# Patient Record
Sex: MALE | Race: WHITE | NOT HISPANIC OR LATINO | Employment: OTHER | URBAN - METROPOLITAN AREA
[De-identification: names, ages, dates, MRNs, and addresses within clinical notes are randomized per-mention and may not be internally consistent; named-entity substitution may affect disease eponyms.]

---

## 2024-02-06 ENCOUNTER — TELEPHONE (OUTPATIENT)
Age: 64
End: 2024-02-06

## 2024-02-06 ENCOUNTER — HOSPITAL ENCOUNTER (OUTPATIENT)
Dept: RADIOLOGY | Facility: HOSPITAL | Age: 64
Discharge: HOME | End: 2024-02-06
Attending: ORTHOPAEDIC SURGERY
Payer: MEDICARE

## 2024-02-06 DIAGNOSIS — S82.61XD CLOSED DISP FRACTURE OF RIGHT LATERAL MALLEOLUS WITH ROUTINE HEALING: Primary | ICD-10-CM

## 2024-02-06 DIAGNOSIS — M25.571 PAIN, JOINT, ANKLE AND FOOT, RIGHT: ICD-10-CM

## 2024-02-06 PROCEDURE — 73610 X-RAY EXAM OF ANKLE: CPT

## 2024-02-06 PROCEDURE — 99204 OFFICE O/P NEW MOD 45 MIN: CPT | Performed by: ORTHOPAEDIC SURGERY

## 2024-02-06 NOTE — PROGRESS NOTES
Assessment:   Diagnosis ICD-10-CM Associated Orders   1. Pain, joint, ankle and foot, right  M25.571 XR ankle 3+ vw right     Ambulatory Referral to Physical Therapy      2. Trimalleolar fracture of ankle, closed, right, initial encounter  S82.851A Ambulatory Referral to Physical Therapy          Plan:  Weightbearing as tolerated  Physical therapy for proprioception exercises  Elevation for swelling as needed  Follow-up in 3 weeks and at this time we will obtain x-rays of the right ankle 3 views  Orders were discussed with patient and patient's wife and all were in agreement with treatment plan    To do next visit:  Return in about 3 months (around 5/6/2024) for right ankle.    The above stated was discussed in layman's terms and the patient expressed understanding.  All questions were answered to the patient's satisfaction.       Scribe Attestation      I,:  Lesa Orlando am acting as a scribe while in the presence of the attending physician.:       I,:  Alissa Turk MD personally performed the services described in this documentation    as scribed in my presence.:               Subjective:   González Manrique is a 64 y.o. male who presents today for an initial evaluation for his right trimalleolar fracture, 11/20/2023 Hew was treated at [facility].   He did have a 2nd opinion who felt he needed surgical intervention. His Oncologist, who felt that he was not a good surgical candidate due to going through surgery.   He was an avid pickleballer and wants to get back on the court.   He has treated in a low tide cam boot with a walker or cane. He reports that the pain has gradually gotten better. He has tried a hike ankle boot for support.       Review of systems negative unless otherwise specified in HPI  Review of Systems   Constitutional:  Negative for chills, fever and unexpected weight change.   HENT:  Negative for hearing loss, nosebleeds and sore throat.    Eyes:  Negative for pain, redness and visual  disturbance.   Respiratory:  Negative for cough, shortness of breath and wheezing.    Cardiovascular:  Negative for chest pain, palpitations and leg swelling.   Gastrointestinal:  Negative for abdominal pain, nausea and vomiting.   Endocrine: Negative for polydipsia and polyuria.   Genitourinary:  Negative for dysuria and hematuria.   Musculoskeletal:  Positive for gait problem.   Skin:  Negative for rash and wound.   Neurological:  Negative for dizziness, light-headedness and headaches.   Psychiatric/Behavioral:  Negative for decreased concentration, dysphoric mood and suicidal ideas. The patient is not nervous/anxious.        History reviewed. No pertinent past medical history.    History reviewed. No pertinent surgical history.    History reviewed. No pertinent family history.    Social History     Occupational History    Not on file   Tobacco Use    Smoking status: Not on file    Smokeless tobacco: Not on file   Substance and Sexual Activity    Alcohol use: Not on file    Drug use: Not on file    Sexual activity: Not on file       No current outpatient medications on file.    No Known Allergies       There were no vitals filed for this visit.    There is no height or weight on file to calculate BMI.  Wt Readings from Last 3 Encounters:   No data found for Wt       Objective:                    Ortho Exam  Right ankle:  No abrasions or open wounds  Generalized swelling but no pitting edema  No tenderness to palpation over the lateral malleolus region  Patient able to actively plantarflex and dorsiflex ankle as well as subtalar motion with inversion and eversion without pain  Strength of all of these muscle groups of about 4 out of 5  No pitting edema  Distally neurologically and vascularly intact  Diagnostics, reviewed and taken today if performed as documented:    None performed      The attending physician has personally reviewed the pertinent films in PACS and interpretation is as follows:  Right  "ankle:  Abundant callus formation noted over the lateral malleolus fracture site  Ankle mortise maintained    Procedures, if performed today:    Procedures    None performed      Portions of the record may have been created with voice recognition software.  Occasional wrong word or \"sound a like\" substitutions may have occurred due to the inherent limitations of voice recognition software.  Read the chart carefully and recognize, using context, where substitutions have occurred.  "

## 2024-02-06 NOTE — PATIENT INSTRUCTIONS
Up with the good and down with the bad when ambulating steps.

## 2024-02-06 NOTE — TELEPHONE ENCOUNTER
Doctor: Rosalee  Caller Name: Lety  SIVAN#:[phone]     Radiology called to speak to clinical team regarding right ankle Xray ordered by provider.

## 2024-04-16 ENCOUNTER — HOSPITAL ENCOUNTER (OUTPATIENT)
Dept: RADIOLOGY | Facility: HOSPITAL | Age: 64
Discharge: HOME | End: 2024-04-16
Attending: ORTHOPAEDIC SURGERY
Payer: MEDICARE

## 2024-04-16 VITALS
HEART RATE: 102 BPM | WEIGHT: 250 LBS | BODY MASS INDEX: 40.18 KG/M2 | DIASTOLIC BLOOD PRESSURE: 96 MMHG | HEIGHT: 66 IN | SYSTOLIC BLOOD PRESSURE: 138 MMHG

## 2024-04-16 DIAGNOSIS — S82.61XD CLOSED DISP FRACTURE OF RIGHT LATERAL MALLEOLUS WITH ROUTINE HEALING: Primary | ICD-10-CM

## 2024-04-16 DIAGNOSIS — S82.61XD CLOSED DISP FRACTURE OF RIGHT LATERAL MALLEOLUS WITH ROUTINE HEALING: ICD-10-CM

## 2024-04-16 PROCEDURE — 73610 X-RAY EXAM OF ANKLE: CPT

## 2024-04-16 PROCEDURE — 99213 OFFICE O/P EST LOW 20 MIN: CPT | Performed by: ORTHOPAEDIC SURGERY

## 2024-04-16 RX ORDER — TEMAZEPAM 15 MG/1
30 CAPSULE ORAL DAILY PRN
COMMUNITY
Start: 2024-03-29 | End: 2024-04-28

## 2024-04-16 RX ORDER — AZELASTINE HYDROCHLORIDE 137 UG/1
SPRAY, METERED NASAL
COMMUNITY

## 2024-04-16 RX ORDER — POTASSIUM CHLORIDE 20 MEQ/1
20 TABLET, EXTENDED RELEASE ORAL DAILY
COMMUNITY
Start: 2023-11-06

## 2024-04-16 RX ORDER — OXYCODONE HYDROCHLORIDE 5 MG/1
TABLET ORAL
COMMUNITY
Start: 2023-10-30

## 2024-04-16 RX ORDER — SPIRONOLACTONE 25 MG/1
25 TABLET ORAL DAILY
COMMUNITY

## 2024-04-16 RX ORDER — FUROSEMIDE 20 MG/1
20 TABLET ORAL DAILY PRN
COMMUNITY
Start: 2024-03-11

## 2024-04-16 RX ORDER — DULOXETIN HYDROCHLORIDE 30 MG/1
30 CAPSULE, DELAYED RELEASE ORAL DAILY
COMMUNITY

## 2024-04-16 RX ORDER — METOPROLOL SUCCINATE 50 MG/1
50 TABLET, EXTENDED RELEASE ORAL DAILY
COMMUNITY
Start: 2023-11-06 | End: 2025-03-11

## 2024-04-16 NOTE — PROGRESS NOTES
Assessment:  1. Closed disp fracture of right lateral malleolus with routine healing  XR ankle 3+ vw right          Plan:    Patient with closed right displaced  trimalleolar  fracture , DOI 11/20/23, well healed on x-ray today   Weightbearing as tolerated   XR right ankle was reviewed in the office today  Provided  patient with strengthening exercises(for eversion and eversion) to do at home with bands   Patient's goal is to walk on the beach this summer and return back to playing pickle ball.         The above stated was discussed in layman's terms and the patient expressed understanding.  All questions were answered to the patient's satisfaction.         Subjective:   González Manrique is a 64 y.o. male who presents today follow up for right ankle to due closed displaced  trimalleolar  fracture , DOI 11/20/23 He has been weightbearing as tolerated. He is using a cane for assistance when ambulating. He states he is doing better. He has finished with physical therapy and has been doing  some of strengthening exercises with bands at home.     He is currently in partial remission from liver cancer.       Review of systems negative unless otherwise specified in HPI    History reviewed. No pertinent past medical history.    History reviewed. No pertinent surgical history.    History reviewed. No pertinent family history.    Social History     Occupational History    Not on file   Tobacco Use    Smoking status: Unknown    Smokeless tobacco: Not on file   Substance and Sexual Activity    Alcohol use: Not on file    Drug use: Not on file    Sexual activity: Not on file         Current Outpatient Medications:     Azelastine HCl 137 MCG/SPRAY SOLN, , Disp: , Rfl:     cabozantinib 40 mg TABS, Take 40 mg by mouth, Disp: , Rfl:     DULoxetine (CYMBALTA) 30 mg delayed release capsule, Take 30 mg by mouth daily, Disp: , Rfl:     furosemide (LASIX) 20 mg tablet, Take 20 mg by mouth daily as needed, Disp: , Rfl:     metoprolol  succinate (TOPROL-XL) 50 mg 24 hr tablet, Take 50 mg by mouth daily, Disp: , Rfl:     oxyCODONE (ROXICODONE) 5 immediate release tablet, take 1 tablet by mouth every 4 hours if needed for MODERATE PAIN (PAIN SCORE 4-6), Disp: , Rfl:     potassium chloride (Klor-Con M20) 20 mEq tablet, Take 20 mEq by mouth daily, Disp: , Rfl:     spironolactone (ALDACTONE) 25 mg tablet, Take 25 mg by mouth daily, Disp: , Rfl:     temazepam (RESTORIL) 15 mg capsule, Take 30 mg by mouth daily as needed, Disp: , Rfl:     No Known Allergies         Vitals:    04/16/24 1209   BP: 138/96   Pulse: 102     Body mass index is 40.35 kg/m².  Wt Readings from Last 3 Encounters:   04/16/24 113 kg (250 lb)       Objective:            Physical Exam  Physical Exam:      General Appearance:    Alert, cooperative, no distress, appears stated age   Head:    Normocephalic, without obvious abnormality, atraumatic   Eyes:    conjunctiva/corneas clear, both eyes         Nose:   Nares normal, septum midline, no drainage    Throat:   Lips normal; teeth and gums normal   Neck:    symmetrical, trachea midline, ;     thyroid:  no enlargement/   Back:     Symmetric, no curvature, ROM normal   Lungs:   No audible wheezing or labored breathing   Chest Wall:    No tenderness or deformity    Heart:    Regular rate and rhythm                         Pulses:   2+ and symmetric all extremities   Skin:   Skin color, texture, turgor normal, no rashes or lesions   Neurologic:   normal strength, sensation and reflexes     throughout                       Ortho Exam  Right ankle  Skin intact  No erythema   Good dorsiflexion plantarflexion  Good inversion and eversion  Palpable bony prominence over the fibula with no pain  NVID     Diagnostics, reviewed and taken today if performed as documented:    The attending physician has personally reviewed the pertinent films in PACS and interpretation is as follows: XR right ankle demonstrates abundant of callus formation over the  "lateral malleolus fracture site, ankle alignment maintained       Procedures, if performed today:    Procedures    None performed      Scribe Attestation      I,:  Zuleyka Barclay am acting as a scribe while in the presence of the attending physician.:       I,:  Alissa Turk MD personally performed the services described in this documentation    as scribed in my presence.:               Portions of the record may have been created with voice recognition software.  Occasional wrong word or \"sound a like\" substitutions may have occurred due to the inherent limitations of voice recognition software.  Read the chart carefully and recognize, using context, where substitutions have occurred.  "

## 2024-05-03 NOTE — TELEPHONE ENCOUNTER
PARRIS. Pt was seen 4/16/24 and wanted to know if he still wanted to keep the 5/7/24 appt with Dr. Turk (Orthopedics)